# Patient Record
Sex: FEMALE | Race: WHITE | NOT HISPANIC OR LATINO | Employment: UNEMPLOYED | URBAN - METROPOLITAN AREA
[De-identification: names, ages, dates, MRNs, and addresses within clinical notes are randomized per-mention and may not be internally consistent; named-entity substitution may affect disease eponyms.]

---
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
## 2022-01-01 LAB — DHEA-S SERPL-MCNC: 284 UG/DL (ref 110–433.2)

## 2022-01-03 NOTE — RESULT ENCOUNTER NOTE
Labs all normal except prolactin  Moderately elevated  Recommend endocrine consult  This can cause absent/irregular menses     Referral entered  Please notify pt

## 2022-01-05 ENCOUNTER — TELEPHONE (OUTPATIENT)
Dept: OBGYN CLINIC | Facility: CLINIC | Age: 22
End: 2022-01-05

## 2022-01-05 NOTE — TELEPHONE ENCOUNTER
----- Message from Jimena Pinto sent at 1/3/2022  9:19 AM EST -----  Labs all normal except prolactin  Moderately elevated  Recommend endocrine consult  This can cause absent/irregular menses     Referral entered  Please notify pt

## 2022-01-10 ENCOUNTER — HOSPITAL ENCOUNTER (OUTPATIENT)
Dept: RADIOLOGY | Facility: MEDICAL CENTER | Age: 22
Discharge: HOME | End: 2022-01-10
Payer: COMMERCIAL

## 2022-01-10 DIAGNOSIS — L68.0 HIRSUTISM: ICD-10-CM

## 2022-01-10 DIAGNOSIS — N92.6 IRREGULAR MENSES: ICD-10-CM

## 2022-01-10 PROCEDURE — 76856 US EXAM PELVIC COMPLETE: CPT

## 2022-01-11 ENCOUNTER — TELEPHONE (OUTPATIENT)
Dept: OBGYN CLINIC | Facility: CLINIC | Age: 22
End: 2022-01-11

## 2022-01-11 NOTE — RESULT ENCOUNTER NOTE
Normal pelvic US  No evidence of PCO appearing ovaries  Done due to painful, heavy and irregular menses  We did discuss the role of hormonal contraception in aiding this , but pt was undecided at the time  Please let me know if she desires to start

## 2022-01-11 NOTE — TELEPHONE ENCOUNTER
----- Message from Yao Moncada, [address] sent at 1/11/2022 12:03 PM EST -----  Normal pelvic US  No evidence of PCO appearing ovaries  Done due to painful, heavy and irregular menses  We did discuss the role of hormonal contraception in aiding this , but pt was undecided at the time  Please let me know if she desires to start

## 2022-01-11 NOTE — TELEPHONE ENCOUNTER
----- Message from Harry Martins, [address] sent at 1/11/2022 12:03 PM EST -----  Normal pelvic US  No evidence of PCO appearing ovaries  Done due to painful, heavy and irregular menses  We did discuss the role of hormonal contraception in aiding this , but pt was undecided at the time  Please let me know if she desires to start

## 2022-01-13 ENCOUNTER — TELEPHONE (OUTPATIENT)
Dept: OBGYN CLINIC | Facility: CLINIC | Age: 22
End: 2022-01-13

## 2022-01-13 NOTE — TELEPHONE ENCOUNTER
Per comm consent lm to pt with same thing I wrote to her on 1/11/22  Told her to let us know if she decides she wants to start any [facility] for her cycle issues

## 2022-01-13 NOTE — TELEPHONE ENCOUNTER
Result note per JS:    Normal pelvic US  No evidence of PCO appearing ovaries  Done due to painful, heavy and irregular menses  We did discuss the role of hormonal contraception in aiding this , but pt was undecided at the time  Please let me know if she desires to start